# Patient Record
Sex: FEMALE | Race: WHITE | URBAN - METROPOLITAN AREA
[De-identification: names, ages, dates, MRNs, and addresses within clinical notes are randomized per-mention and may not be internally consistent; named-entity substitution may affect disease eponyms.]

---

## 2017-08-08 PROCEDURE — 87624 HPV HI-RISK TYP POOLED RSLT: CPT | Performed by: OBSTETRICS & GYNECOLOGY

## 2017-08-08 PROCEDURE — 88175 CYTOPATH C/V AUTO FLUID REDO: CPT | Performed by: OBSTETRICS & GYNECOLOGY

## 2018-04-18 PROBLEM — F41.1 GAD (GENERALIZED ANXIETY DISORDER): Status: ACTIVE | Noted: 2018-04-18

## 2019-07-18 PROCEDURE — 88175 CYTOPATH C/V AUTO FLUID REDO: CPT | Performed by: OBSTETRICS & GYNECOLOGY

## 2019-07-18 PROCEDURE — 87624 HPV HI-RISK TYP POOLED RSLT: CPT | Performed by: OBSTETRICS & GYNECOLOGY

## 2024-02-19 ENCOUNTER — APPOINTMENT (OUTPATIENT)
Dept: GENERAL RADIOLOGY | Age: 39
End: 2024-02-19
Attending: NURSE PRACTITIONER
Payer: COMMERCIAL

## 2024-02-19 PROCEDURE — 71046 X-RAY EXAM CHEST 2 VIEWS: CPT | Performed by: NURSE PRACTITIONER

## 2024-06-13 ENCOUNTER — HOSPITAL ENCOUNTER (OUTPATIENT)
Age: 39
Discharge: HOME | End: 2024-06-13
Payer: COMMERCIAL

## 2024-06-13 VITALS
DIASTOLIC BLOOD PRESSURE: 82 MMHG | HEART RATE: 87 BPM | OXYGEN SATURATION: 99 % | RESPIRATION RATE: 18 BRPM | SYSTOLIC BLOOD PRESSURE: 126 MMHG | TEMPERATURE: 99 F

## 2024-06-13 DIAGNOSIS — G43.809 OTHER MIGRAINE WITHOUT STATUS MIGRAINOSUS, NOT INTRACTABLE: Primary | ICD-10-CM

## 2024-06-13 LAB — B-HCG UR QL: NEGATIVE

## 2024-06-13 PROCEDURE — 96375 TX/PRO/DX INJ NEW DRUG ADDON: CPT

## 2024-06-13 PROCEDURE — 96374 THER/PROPH/DIAG INJ IV PUSH: CPT

## 2024-06-13 PROCEDURE — 99214 OFFICE O/P EST MOD 30 MIN: CPT

## 2024-06-13 PROCEDURE — 81025 URINE PREGNANCY TEST: CPT

## 2024-06-13 PROCEDURE — 96361 HYDRATE IV INFUSION ADD-ON: CPT

## 2024-06-13 RX ORDER — KETOROLAC TROMETHAMINE 30 MG/ML
30 INJECTION, SOLUTION INTRAMUSCULAR; INTRAVENOUS ONCE
Status: COMPLETED | OUTPATIENT
Start: 2024-06-13 | End: 2024-06-13

## 2024-06-13 RX ORDER — METOCLOPRAMIDE HYDROCHLORIDE 5 MG/ML
10 INJECTION INTRAMUSCULAR; INTRAVENOUS ONCE
Status: COMPLETED | OUTPATIENT
Start: 2024-06-13 | End: 2024-06-13

## 2024-06-13 RX ORDER — SODIUM CHLORIDE 9 MG/ML
1000 INJECTION, SOLUTION INTRAVENOUS ONCE
Status: COMPLETED | OUTPATIENT
Start: 2024-06-13 | End: 2024-06-13

## 2024-06-13 RX ORDER — DIPHENHYDRAMINE HYDROCHLORIDE 50 MG/ML
25 INJECTION INTRAMUSCULAR; INTRAVENOUS ONCE
Status: COMPLETED | OUTPATIENT
Start: 2024-06-13 | End: 2024-06-13

## 2024-06-13 NOTE — DISCHARGE INSTRUCTIONS
Limit activities that require a lot of thought or concentration  Get plenty of rest   Drink plenty of fluids   Alternate Advil/Tylenol as needed for pain     If you experience severe/worsening headache, weakness/numbness in your body, slurred speech, vision changes, confusion, repeated vomiting, loss of consciousness, or any other concerning symptoms, go to nearest ER immediately

## 2024-06-13 NOTE — ED INITIAL ASSESSMENT (HPI)
Migraine since Saturday. No improvement with prescribed meds. Denies vision changes. +photophobia.

## 2024-06-13 NOTE — ED PROVIDER NOTES
Chief Complaint   Patient presents with    Headache     History obtained from: patient,  at bedside   services not used     HPI:     Heather Lawrence is a 39 year old female who presents with migraine x 5 days. Patient localizes pain to right side of head and endorses associated photophobia. Patient has a history of migraines and states symptoms feel exactly to same as previous migraines without new or worse features. Patient states she has been taking her prescribed rizatriptan migraine medication without relief of current symptoms. Denies head injury, LOC, vision loss, speech changes, numbness, weakness, neck stiffness, fevers, chills, chest pain, shortness of breath, abdominal pain, vomiting, rash.  Patient has upcoming appointment with neurology scheduled.    PMH  Past Medical History:    Anxiety state, unspecified    Chronic daily headache    Depression    Depressive disorder, not elsewhere classified    Migraine    Migraines    OCD (obsessive compulsive disorder)    Seasonal allergies       PFSH    PFSH asessment screens reviewed and agree.  Nurses notes reviewed I agree with documentation.    Family History   Problem Relation Age of Onset    Learning Disability Father     Anxiety Mother     Depression Mother     Learning Disability Mother     Migraines Mother     OCD Mother     Stroke Mother     Anxiety Maternal Grandmother     Cancer Maternal Grandmother         Cervical    Depression Maternal Grandmother     Dementia Maternal Grandfather     Diabetes Maternal Grandfather     Heart Attack Maternal Grandfather     Hypertension Maternal Grandfather     Arthritis Paternal Grandmother     Cancer Paternal Grandfather         Stomach     Family history reviewed with patient/caregiver and is not pertinent to presenting problem.  Social History     Socioeconomic History    Marital status:      Spouse name: Not on file    Number of children: Not on file    Years of education: Not on file     Highest education level: Not on file   Occupational History    Occupation: teacher   Tobacco Use    Smoking status: Never    Smokeless tobacco: Never   Vaping Use    Vaping status: Never Used   Substance and Sexual Activity    Alcohol use: Yes     Alcohol/week: 0.0 standard drinks of alcohol     Comment: very rarely, maybe 1-2 month    Drug use: No    Sexual activity: Yes     Partners: Male     Birth control/protection: Pill   Other Topics Concern     Service Not Asked    Blood Transfusions Not Asked    Caffeine Concern Not Asked    Occupational Exposure Not Asked    Hobby Hazards Not Asked    Sleep Concern Not Asked    Stress Concern Not Asked    Weight Concern Not Asked    Special Diet Not Asked    Back Care Not Asked    Exercise Yes    Bike Helmet Not Asked    Seat Belt Yes    Self-Exams Not Asked   Social History Narrative    Not on file     Social Determinants of Health     Financial Resource Strain: Not on file   Food Insecurity: Not on file   Transportation Needs: Not on file   Physical Activity: Not on file   Stress: Not on file   Social Connections: Not on file   Housing Stability: Not on file         ROS:   Positive for stated complaint: migraine   All other systems reviewed and negative except as noted above.  Constitutional and Vital Signs Reviewed.    Physical Exam:     Findings:    /82   Pulse 87   Temp 98.6 °F (37 °C) (Oral)   Resp 18   SpO2 99%   GENERAL: well developed, no acute distress, non-toxic appearing   SKIN: good skin turgor, no obvious rashes  HEAD: normocephalic, atraumatic  EYES: sclera non-icteric bilaterally, conjunctiva clear bilaterally, PERRLA, EOMs intact   EARS: canals clear bilaterally, TMs clear bilaterally  NOSE: nasal turbinates pink, normal mucosa  OROPHARYNX: MMM, pharynx clear, maintaining airway and secretions  NECK: supple, no nuchal rigidity, no trismus, no edema, phonation normal    CARDIO: RRR, normal heart sounds   LUNGS: clear to auscultation  bilaterally, no increased WOB, no rales, rhonchi, or wheezes  GI: abdomen soft and non-tender   EXTREMITIES: no cyanosis or edema, NOWAK without difficulty  NEURO: Cranial nerves II through XII intact, finger-nose coordination intact, no palmar drift, BUE and BLE strength 5/5 and sensation intact to light touch, ambulating with steady gait  PSYCH: alert and oriented x3, answering questions appropriately, mood appropriate    MDM/Assessment/Plan:   Orders for this encounter:    Orders Placed This Encounter    POCT Pregnancy, Urine    Insert Peripheral IV    POCT Pregnancy, Urine    sodium chloride 0.9% infusion 1,000 mL    ketorolac (Toradol) 30 MG/ML injection 30 mg    metoclopramide (Reglan) 5 mg/mL injection 10 mg    diphenhydrAMINE (Benadryl) 50 mg/mL  injection 25 mg       Labs performed this visit:  Recent Results (from the past 10 hour(s))   POCT Pregnancy, Urine    Collection Time: 06/13/24  2:05 PM   Result Value Ref Range    POCT Urine Pregnancy Negative Negative       Imaging performed this visit:  No orders to display       Medical Decision Making  DDx includes migraine versus tension headache versus cluster headache versus other.  Patient is overall well-appearing with a well-documented history of migraines.  No red flags in history or exam.  Patient reports no new or worse features compared to previous migraines therefore offered symptomatic management with migraine cocktail which patient is agreeable to at this time.  Urine pregnancy test negative.  Patient given IV fluids, Toradol, Reglan, and Benadryl.    On reassessment, patient resting comfortably and remains well-appearing.  Vital signs are stable.  Patient states she is feeling better and headache has significantly improved.  No new or worsening symptoms throughout IC stay.  Given improvement in symptoms, will discharge patient to home.  Discussed supportive care including rest, increase fluid intake, and OTC Tylenol/Advil as needed for pain.   Instructed patient to go directly to nearest ER with any worsening or concerning symptoms.  Follow-up with neurology.    Discussed case with supervising attending Dr. De Dios who is in agreement with assessment and plan.    Amount and/or Complexity of Data Reviewed  External Data Reviewed: radiology.     Details: CT angio of head on 6/7/2020 for migraines showed no acute intracranial abnormality and patency of the Patency of the intracranial arterial circulation and arterial vasculature of the neck.     Labs: ordered.    Risk  OTC drugs.        Diagnosis:    ICD-10-CM    1. Other migraine without status migrainosus, not intractable  G43.809           All results reviewed and discussed with patient/patient's family. Patient/patient's family verbalize excellent understanding of instructions and feels comfortable with plan. All of patient's/patient's family's questions were addressed.   See AVS for detailed discharge instructions for your condition today.    Follow Up with:  Brandie Ortega MD  [address] [place] [address]  [address]  [facility] [address]  [phone]          Linda Mary, DO  [address] [address] [phone]      Neurology      Note: This document was dictated using Dragon medical dictation software.  Proofreading was performed to the best of my ability, but errors may be present.    Ashley Melton PA-C

## 2024-09-01 ENCOUNTER — HOSPITAL ENCOUNTER (OUTPATIENT)
Age: 39
Discharge: HOME | End: 2024-09-01
Payer: COMMERCIAL

## 2024-09-01 VITALS
DIASTOLIC BLOOD PRESSURE: 86 MMHG | OXYGEN SATURATION: 100 % | RESPIRATION RATE: 20 BRPM | SYSTOLIC BLOOD PRESSURE: 138 MMHG | HEART RATE: 97 BPM | TEMPERATURE: 98 F

## 2024-09-01 DIAGNOSIS — J98.8 VIRAL RESPIRATORY ILLNESS: Primary | ICD-10-CM

## 2024-09-01 DIAGNOSIS — B97.89 VIRAL RESPIRATORY ILLNESS: Primary | ICD-10-CM

## 2024-09-01 DIAGNOSIS — R01.1 CARDIAC MURMUR: ICD-10-CM

## 2024-09-01 LAB
S PYO AG THROAT QL IA.RAPID: NEGATIVE
SARS-COV-2 RNA RESP QL NAA+PROBE: NOT DETECTED

## 2024-09-01 PROCEDURE — 99212 OFFICE O/P EST SF 10 MIN: CPT

## 2024-09-01 PROCEDURE — 87651 STREP A DNA AMP PROBE: CPT | Performed by: NURSE PRACTITIONER

## 2024-09-01 PROCEDURE — 99213 OFFICE O/P EST LOW 20 MIN: CPT

## 2024-09-01 NOTE — ED PROVIDER NOTES
Patient Seen in: [facility]    History   CC: sore throat  HPI: Heather Lawrence 39 year old female  who presents c/o sore throat beginning yesterday with bilateral ear pain and itching, sinus congestion.  States ear discomfort and itching is better today however sore throat became worse thus prompting evaluation.  2 negative home COVID test yesterday.  States she is a  and exposed to a number of illnesses.  Denies difficulty breathing, difficulty swallowing/drooling, fever, GI signs/symptoms.    Past Medical History:    Anxiety state, unspecified    Chronic daily headache    Depression    Depressive disorder, not elsewhere classified    Migraine    Migraines    OCD (obsessive compulsive disorder)    Seasonal allergies       Past Surgical History:   Procedure Laterality Date    Cosmetic ear lobe repair      Elbow surgery      [place] teeth removed         Family History   Problem Relation Age of Onset    Learning Disability Father     Anxiety Mother     Depression Mother     Learning Disability Mother     Migraines Mother     OCD Mother     Stroke Mother     Anxiety Maternal Grandmother     Cancer Maternal Grandmother         Cervical    Depression Maternal Grandmother     Dementia Maternal Grandfather     Diabetes Maternal Grandfather     Heart Attack Maternal Grandfather     Hypertension Maternal Grandfather     Arthritis Paternal Grandmother     Cancer Paternal Grandfather         Stomach       Social History     Socioeconomic History    Marital status:    Occupational History    Occupation: teacher   Tobacco Use    Smoking status: Never    Smokeless tobacco: Never   Vaping Use    Vaping status: Never Used   Substance and Sexual Activity    Alcohol use: Yes     Alcohol/week: 0.0 standard drinks of alcohol     Comment: very rarely, maybe 1-2 month    Drug use: No    Sexual activity: Yes     Partners: Male     Birth control/protection: Pill   Other Topics Concern    Exercise  Yes    Seat Belt Yes       ROS:  Review of Systems    Positive for stated complaint: possible strep  Other systems are as noted in HPI.  Constitutional and vital signs reviewed.      All other systems reviewed and negative except as noted above.    PSFH elements reviewed from today and agreed except as otherwise stated in HPI.             Constitutional and vital signs reviewed.        Physical Exam     ED Triage Vitals   BP 09/01/24 0843 138/86   Pulse 09/01/24 0841 97   Resp 09/01/24 0841 20   Temp 09/01/24 0841 97.7 °F (36.5 °C)   Temp src 09/01/24 0841 Temporal   SpO2 09/01/24 0841 100 %   O2 Device 09/01/24 0841 None (Room air)       Current:/86   Pulse 97   Temp 97.7 °F (36.5 °C) (Temporal)   Resp 20   SpO2 100%         PE:  General - Appears well, non-toxic and in NAD  Head - Appears symmetrical without deformity/swelling cranium, scalp, or facial bones  Eyes - sclera not injected, no discharge noted, no periorbital edema  ENT - EAC bilaterally without discharge, TM pearly grey with COL visualized appropriately bilaterally.   no nasal drainage noted in nares bilat, no cobblestoning to post. Pharynx.   Oropharynx clear, posterior pharynx is without erythema and without tonsilar enlargement or exudate, uvula midline, +gag, voice is clear. No trismus  Neck - no significant adenopathy, supple with trachea midline  Resp - Lung sounds clear bilaterally and wob unlabored, good aeration with equal, even expansion bilaterally   CV - RRR, ++ murmur  GI - Appears round and flat, BS +x4 quadrants, no tenderness/guarding with palpation  Skin - no rashes or petechiae noted, pink warm and dry throughout, mmm, cap refill <2seconds  Neuro - A&O x4, steady gait  MSK - makes purposeful movements of all extremities, radial pulses 2+ bilat.  Psych - Interactive and appropriate      ED Course     Labs Reviewed   RAPID STREP A - Normal   RAPID SARS-COV-2 BY PCR - Normal       MDM     DDx: Strep pharyngitis, COVID-19,  unspecified viral illness    Rapid strep negative.  Rapid COVID PCR negative.  Results discussed with patient as well as general viral illness instructions reviewed, rest, hydration instructions, Tylenol or Motrin as needed for discomfort, throat lozenges, salt or gargles, warm beverages, follow-up and return/ED precautions reviewed. Heart murmur auscultated on exam also discussed with patient.  Patient states she has been told this in the past with previous visit to immediate care and then followed up with her PCP who did not seem to hear the heart murmur.  Discussed with patient murmur is still present and need for follow-up as additional diagnostics are warranted with a new onset murmur in adulthood.  Patient is agreeable, patient is historian and demonstrates understanding of all instruction and agrees with plan of care.  This case was also discussed with Dr. Hernandez who agrees with plan of care.      Disposition and Plan     Clinical Impression:  1. Viral respiratory illness    2. Cardiac murmur        Disposition:  Discharge    Follow-up:  Brandie Ortega MD  [address] [facility]  [address]  [facility] [address]  [phone]    Go in 1 week  As needed    [facility]  [address]  [facility] [address] [phone]  Schedule an appointment as soon as possible for a visit in 1 week        Medications Prescribed:  Discharge Medication List as of 9/1/2024  9:11 AM

## 2024-09-01 NOTE — ED INITIAL ASSESSMENT (HPI)
Sore throat, bilateral ear pain with sinus congestion since yesterday, had 2 - covid home tests, no fever